# Patient Record
Sex: MALE | NOT HISPANIC OR LATINO | URBAN - NONMETROPOLITAN AREA
[De-identification: names, ages, dates, MRNs, and addresses within clinical notes are randomized per-mention and may not be internally consistent; named-entity substitution may affect disease eponyms.]

---

## 2024-04-15 ENCOUNTER — OFFICE VISIT (OUTPATIENT)
Dept: PRIMARY CARE | Facility: CLINIC | Age: 69
End: 2024-04-15
Payer: COMMERCIAL

## 2024-04-15 VITALS
HEIGHT: 72 IN | HEART RATE: 77 BPM | TEMPERATURE: 97.8 F | BODY MASS INDEX: 32.1 KG/M2 | SYSTOLIC BLOOD PRESSURE: 108 MMHG | OXYGEN SATURATION: 98 % | DIASTOLIC BLOOD PRESSURE: 76 MMHG | WEIGHT: 237 LBS

## 2024-04-15 DIAGNOSIS — J02.9 SORE THROAT: ICD-10-CM

## 2024-04-15 PROBLEM — R01.1 HEART MURMUR: Status: ACTIVE | Noted: 2024-04-15

## 2024-04-15 PROBLEM — I10 BENIGN ESSENTIAL HYPERTENSION: Status: ACTIVE | Noted: 2024-04-15

## 2024-04-15 LAB — POC RAPID STREP: NEGATIVE

## 2024-04-15 PROCEDURE — 3078F DIAST BP <80 MM HG: CPT

## 2024-04-15 PROCEDURE — 1159F MED LIST DOCD IN RCRD: CPT

## 2024-04-15 PROCEDURE — 87880 STREP A ASSAY W/OPTIC: CPT

## 2024-04-15 PROCEDURE — 99213 OFFICE O/P EST LOW 20 MIN: CPT

## 2024-04-15 PROCEDURE — 1160F RVW MEDS BY RX/DR IN RCRD: CPT

## 2024-04-15 PROCEDURE — 3074F SYST BP LT 130 MM HG: CPT

## 2024-04-15 ASSESSMENT — PATIENT HEALTH QUESTIONNAIRE - PHQ9
SUM OF ALL RESPONSES TO PHQ9 QUESTIONS 1 AND 2: 0
1. LITTLE INTEREST OR PLEASURE IN DOING THINGS: NOT AT ALL
2. FEELING DOWN, DEPRESSED OR HOPELESS: NOT AT ALL

## 2024-04-15 NOTE — PATIENT INSTRUCTIONS
If you do not feel better by Thursday then call and I will send in an antibiotic  Probably viral will take 7-10 days to feel better  Continue cough drops, tylenol as needed, liquids  Try saline nasal spray - can buy over the counter

## 2024-04-15 NOTE — PROGRESS NOTES
Subjective   Patient ID: Reynaldo Ramos is a 68 y.o. male who presents for Sore Throat (Sore throat for 5 days) and Sinusitis.  HPI  Reynaldo presents for sore throat that he has had for 5 days   His throat feels a little swollen  Throat feels better today, last night throat felt the worse  A little sinus pressure under eyes and in his forehead  Was out all day Saturday but felt good  No runny nose  Cough - sometimes but not much mucous, if he does cough up mucous it is clear - yellow in color, no blood   Cough was bad this morning but has not coughed since then   Took cough syrup last night before bed   After he got up and went to work he quit coughing  No fever, no chills   No diarrhea, no vomiting   No known sick exposures    Past Surgical History:   Procedure Laterality Date    HERNIA REPAIR  02/04/2015    Hernia Repair    TOTAL HIP ARTHROPLASTY  02/04/2015    Total Hip Replacement      Past Medical History:   Diagnosis Date    Other conditions influencing health status 02/05/2015    Irritation symptom     Social History     Tobacco Use    Smoking status: Former     Types: Cigarettes, Pipe, Cigars    Smokeless tobacco: Never        Review of Systems  10 point review of systems performed and is negative except as noted in the HPI.    No current outpatient medications on file.     Objective   /76   Pulse 77   Temp 36.6 °C (97.8 °F)   Ht 1.829 m (6')   Wt 108 kg (237 lb)   SpO2 98%   BMI 32.14 kg/m²     Physical Exam  Constitutional:       General: He is not in acute distress.     Appearance: Normal appearance. He is not ill-appearing.   HENT:      Head: Normocephalic and atraumatic.      Right Ear: Tympanic membrane, ear canal and external ear normal.      Left Ear: Tympanic membrane, ear canal and external ear normal.      Nose: Congestion present.      Mouth/Throat:      Mouth: Mucous membranes are moist.      Pharynx: Oropharynx is clear. Posterior oropharyngeal erythema present. No oropharyngeal exudate.    Eyes:      Conjunctiva/sclera: Conjunctivae normal.      Pupils: Pupils are equal, round, and reactive to light.   Neck:      Vascular: No carotid bruit.   Cardiovascular:      Rate and Rhythm: Normal rate and regular rhythm.      Heart sounds: Murmur (RUSB and apex) heard.   Pulmonary:      Effort: Pulmonary effort is normal.      Breath sounds: Normal breath sounds. No wheezing, rhonchi or rales.   Abdominal:      General: Bowel sounds are normal.      Palpations: Abdomen is soft.      Tenderness: There is no abdominal tenderness.   Musculoskeletal:      Right lower leg: No edema.      Left lower leg: No edema.   Neurological:      Mental Status: He is alert and oriented to person, place, and time.         Assessment/Plan   Problem List Items Addressed This Visit    None  Visit Diagnoses       Sore throat        Relevant Orders    POCT Rapid Strep A manually resulted (Completed)          Sore throat  Rapid strep negative  Dicussed likely viral   Supportive care  If no improvement by the end of the week please call      Discussed at visit any disease processes that were of concern as well as the risks, benefits and instructions on any new medication provided. Patient (and/or caretaker of patient if present) stated all questions were answered, and they voiced understanding of instructions.